# Patient Record
Sex: MALE | Race: OTHER | HISPANIC OR LATINO | Employment: STUDENT | URBAN - METROPOLITAN AREA
[De-identification: names, ages, dates, MRNs, and addresses within clinical notes are randomized per-mention and may not be internally consistent; named-entity substitution may affect disease eponyms.]

---
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
## 2022-03-22 ENCOUNTER — APPOINTMENT (OUTPATIENT)
Dept: RADIOLOGY | Facility: CLINIC | Age: 22
End: 2022-03-22
Payer: MEDICARE

## 2022-03-22 ENCOUNTER — OFFICE VISIT (OUTPATIENT)
Dept: OBGYN CLINIC | Facility: CLINIC | Age: 22
End: 2022-03-22
Payer: MEDICARE

## 2022-03-22 VITALS
HEART RATE: 79 BPM | DIASTOLIC BLOOD PRESSURE: 72 MMHG | BODY MASS INDEX: 18.87 KG/M2 | WEIGHT: 147 LBS | SYSTOLIC BLOOD PRESSURE: 114 MMHG | HEIGHT: 74 IN

## 2022-03-22 DIAGNOSIS — M23.92 INTERNAL DERANGEMENT OF LEFT KNEE: Primary | ICD-10-CM

## 2022-03-22 DIAGNOSIS — M25.562 ACUTE PAIN OF LEFT KNEE: ICD-10-CM

## 2022-03-22 PROCEDURE — 99203 OFFICE O/P NEW LOW 30 MIN: CPT | Performed by: ORTHOPAEDIC SURGERY

## 2022-03-22 PROCEDURE — 73562 X-RAY EXAM OF KNEE 3: CPT

## 2022-03-22 NOTE — PROGRESS NOTES
Assessment/Plan:   Diagnoses and all orders for this visit:    Internal derangement of left knee  -     XR knee 3 vw left non injury; Future  -     MRI knee left wo contrast; Future    Reviewed today's physical exam findings and x-ray findings with patient at time of visit  His physical exam demonstrated mild pain exacerbation with patellar grind, and he does exhibit laxity with patellar glide test   He is being provided a referral for MRI of the left knee for further examination as his x-rays did not demonstrate any acute osseous abnormality  There are no restrictions being imposed at this time  He can continue activity modification, use of a compression sleeve, ice, and NSAIDs as needed for pain and soreness  He will be seen for follow-up after his MRI is complete  Patient expresses understanding is in agreement with treatment plan  The patient has diffuse medial joint tenderness along his left knee with a positive Mylene's  Is my opinion that he may have a tear of his medial meniscus  An MRI is the gold standard to look from of this will pathology  The patient has mechanical issues  He has performed a home exercise program for the past several weeks without any success as well  Subjective:   Patient ID: Sylwia Bryanler  2000     HPI  Patient is a 22 y o  male who presents for initial evaluation of spontaneous onset left knee pain  Patient states he began experiencing pain on 3/9/2022, without any inciting injury or incident  He describes achy pain in the anterior aspect of the knee that is exacerbated with prolonged weight-bearing activity, or prolonged sitting  He is currently employed as and inventory  for [employer]  He denies any associated bruising, swelling, numbness, tingling, or feelings of instability  He is not great taking any medications for pain      The following portions of the patient's history were reviewed and updated as appropriate:  Past medical history, past surgical history, Family history, social history, current medications and allergies    Past Medical History:   Diagnosis Date    ADHD        History reviewed  No pertinent surgical history  Family History   Problem Relation Age of Onset    Anemia Mother     Hypertension Maternal Grandmother        Social History     Socioeconomic History    Marital status: Single     Spouse name: None    Number of children: None    Years of education: None    Highest education level: None   Occupational History    None   Tobacco Use    Smoking status: Never Smoker    Smokeless tobacco: Never Used   Vaping Use    Vaping Use: Some days   Substance and Sexual Activity    Alcohol use: No    Drug use: No    Sexual activity: None   Other Topics Concern    None   Social History Narrative    None     Social Determinants of Health     Financial Resource Strain: Not on file   Food Insecurity: Not on file   Transportation Needs: Not on file   Physical Activity: Not on file   Stress: Not on file   Social Connections: Not on file   Intimate Partner Violence: Not on file   Housing Stability: Not on file         Current Outpatient Medications:     amphetamine-dextroamphetamine (ADDERALL XR, 30MG,) 30 MG 24 hr capsule, every 24 hours (Patient not taking: Reported on 3/22/2022 ), Disp: , Rfl:     risperiDONE (RisperDAL) 2 mg tablet, Every 12 hours (Patient not taking: Reported on 3/22/2022 ), Disp: , Rfl:     Allergies   Allergen Reactions    Bee Venom Swelling       Review of Systems   Constitutional: Negative for chills, fever and unexpected weight change  HENT: Negative for hearing loss, nosebleeds and sore throat  Eyes: Negative for pain, redness and visual disturbance  Respiratory: Negative for cough, shortness of breath and wheezing  Cardiovascular: Negative for chest pain, palpitations and leg swelling  Gastrointestinal: Negative for abdominal pain, nausea and vomiting     Endocrine: Negative for polydipsia and polyuria  Genitourinary: Negative for dysuria and hematuria  Musculoskeletal:        As noted in HPI   Skin: Negative for rash and wound  Neurological: Negative for dizziness, numbness and headaches  Psychiatric/Behavioral: Negative for decreased concentration and suicidal ideas  The patient is not nervous/anxious  Objective:  /72   Pulse 79   Ht 6' 2" (1 88 m)   Wt 66 7 kg (147 lb)   BMI 18 87 kg/m²     Ortho Exam  Left knee -   Patient ambulates with normal gait pattern  Uses no assistive device  Patient presents with  No anatomical deformity  Skin is warm and dry to touch with no signs of erythema, ecchymosis, infection  No soft tissue swelling, no effusion noted  ROM 0 degrees-135 degrees  Mildly TTP over lateral patellar facet, nontender over medial patella facet, nontender over medial or lateral joint lines, nontender over quadriceps tendon or patellar tendon  Flexor and extensor mechanisms intact  Knee is stable to varus and valgus stress  - Lachman's  -- Anterior Drawer, - Posterior Drawer  - medial Mylene's, - lateral Mylene's  - Pivot Shift  - patellar apprehension  Mildly positive patellar grind  Patella tracks centrally without palpable crepitus  Calf compartments are soft and supple  2+ TP and DP pulses with brisk capillary refill to the toes  Sural, saphenous, tibial, superficial and deep peroneal motor and sensory distributions intact  Sensation light touch intact distally    Physical Exam  Vitals reviewed  Constitutional:       Appearance: He is well-developed  HENT:      Head: Normocephalic and atraumatic  Nose: Nose normal    Eyes:      Conjunctiva/sclera: Conjunctivae normal    Cardiovascular:      Rate and Rhythm: Normal rate  Pulmonary:      Effort: Pulmonary effort is normal    Musculoskeletal:      Cervical back: Neck supple  Skin:     General: Skin is warm and dry        Capillary Refill: Capillary refill takes less than 2 seconds  Neurological:      Mental Status: He is alert and oriented to person, place, and time  Psychiatric:         Mood and Affect: Mood normal          Behavior: Behavior normal         Diagnostic Test Review:  Attending Physician has personally reviewed pertinent imaging in PACS, impression is as follows:    Review of radiographic series taken 3/23/2022 of the left knee shows no sign of acute osseous abnormality or malalignment  No sign of lytic or blastic lesion, no sign of benign lesion as noted on x-ray report from [facility]      Procedures   No procedures performed this visit    Scribe Attestation    I,:  Vijaya Cheek am acting as a scribe while in the presence of the attending physician :       I,:  Zaki Arriola, DO personally performed the services described in this documentation    as scribed in my presence :

## 2022-03-22 NOTE — LETTER
March 22, 2022     Patient: Kaitlynn Burnette   YOB: 2000   Date of Visit: 3/22/2022       To Whom it May Concern:    Kaitlynn Burnette is under my professional care  He was seen in my office on 3/22/2022  He can return to work without limitations or restrictions beginning 3/23/2022  If you have any questions or concerns, please don't hesitate to call           Sincerely,          Julio Cesar Lee DO        CC: No Recipients

## 2022-03-24 ENCOUNTER — TELEPHONE (OUTPATIENT)
Dept: OBGYN CLINIC | Facility: CLINIC | Age: 22
End: 2022-03-24

## 2022-03-24 NOTE — TELEPHONE ENCOUNTER
Called both the pt and his mother to make them aware that his MRI will have to be scheduled somewhere else since the [facility] is not in network w his ins  I left a cb #

## 2022-03-25 NOTE — TELEPHONE ENCOUNTER
Received an approval letter from the [employer] for pt's MRI  Called pt and hollim making him aware he is good to go for his MRI on 3/30

## 2022-03-30 ENCOUNTER — HOSPITAL ENCOUNTER (OUTPATIENT)
Dept: MRI IMAGING | Facility: HOSPITAL | Age: 22
Discharge: HOME | End: 2022-03-30
Attending: ORTHOPAEDIC SURGERY
Payer: MEDICARE

## 2022-03-30 DIAGNOSIS — M23.92 INTERNAL DERANGEMENT OF LEFT KNEE: ICD-10-CM

## 2022-03-30 PROCEDURE — 73721 MRI JNT OF LWR EXTRE W/O DYE: CPT

## 2022-11-22 ENCOUNTER — OFFICE VISIT (OUTPATIENT)
Dept: URGENT CARE | Facility: CLINIC | Age: 22
End: 2022-11-22

## 2022-11-22 VITALS
RESPIRATION RATE: 18 BRPM | HEART RATE: 80 BPM | DIASTOLIC BLOOD PRESSURE: 57 MMHG | SYSTOLIC BLOOD PRESSURE: 108 MMHG | TEMPERATURE: 98.4 F | OXYGEN SATURATION: 96 % | BODY MASS INDEX: 18.87 KG/M2 | WEIGHT: 147 LBS

## 2022-11-22 DIAGNOSIS — R68.89 FLU-LIKE SYMPTOMS: Primary | ICD-10-CM

## 2022-11-22 DIAGNOSIS — R05.1 ACUTE COUGH: ICD-10-CM

## 2022-11-22 RX ORDER — BENZONATATE 100 MG/1
100 CAPSULE ORAL 3 TIMES DAILY PRN
Qty: 20 CAPSULE | Refills: 0 | Status: SHIPPED | OUTPATIENT
Start: 2022-11-22

## 2022-11-22 NOTE — PROGRESS NOTES
[facility]        NAME: Carlito Arora is a 25 y o  male  : 2000    MRN: [id number]  DATE: 2022  TIME: 2:13 PM    Assessment and Plan   Flu-like symptoms [R68 89]  1  Flu-like symptoms        2  Acute cough  benzonatate (TESSALON PERLES) 100 mg capsule        - Symptoms most likely due to viral infection  - Patient is non-toxic with stable vital signs and no signs of respiratory distress   - Covid/Flu PCR sent  Results back in 24-48 hours  I will call with positive results or patient may call office to request results   - Supportive care with rest, fluids, and OTC decongestants, nasal sprays, cough suppressants, Tylenol/Ibuprofen PRN   - Educated on return precautions to ED for any new or worsening symptoms including difficulty breathing, chest pain, and high fever       Patient Instructions       Follow up with PCP in 3-5 days  Proceed to  ER if symptoms worsen  Chief Complaint     Chief Complaint   Patient presents with   • Nausea   • Cough   • Fatigue     Started Thursday thinks he has flu         History of Present Illness       Patient is a 26 yo male who presents for evaluation of cough, congestion, body aches, nausea, fatigue x 6 days  Denies fevers, trouble breathing, weakness, inability to tolerate PO intake  No abdominal pain, vomiting, or diarrhea  Review of Systems   Review of Systems   Constitutional: Positive for fatigue  Negative for chills and fever  HENT: Positive for congestion  Negative for ear discharge, ear pain, postnasal drip, rhinorrhea, sinus pressure, sinus pain and sore throat  Eyes: Negative for visual disturbance  Respiratory: Positive for cough  Negative for chest tightness, shortness of breath and wheezing  Cardiovascular: Negative for chest pain  Gastrointestinal: Positive for nausea  Negative for abdominal pain, diarrhea and vomiting  Musculoskeletal: Positive for arthralgias and myalgias     Neurological: Negative for dizziness, weakness, numbness and headaches  Psychiatric/Behavioral: Negative for behavioral problems and confusion  Current Medications       Current Outpatient Medications:   •  benzonatate (TESSALON PERLES) 100 mg capsule, Take 1 capsule (100 mg total) by mouth 3 (three) times a day as needed for cough, Disp: 20 capsule, Rfl: 0  •  amphetamine-dextroamphetamine (ADDERALL XR, 30MG,) 30 MG 24 hr capsule, every 24 hours (Patient not taking: Reported on 3/22/2022 ), Disp: , Rfl:   •  risperiDONE (RisperDAL) 2 mg tablet, Every 12 hours (Patient not taking: Reported on 3/22/2022 ), Disp: , Rfl:     Current Allergies     Allergies as of 11/22/2022 - Reviewed 11/22/2022   Allergen Reaction Noted   • Bee venom Swelling 09/10/2018            The following portions of the patient's history were reviewed and updated as appropriate: allergies, current medications, past family history, past medical history, past social history, past surgical history and problem list      Past Medical History:   Diagnosis Date   • ADHD        History reviewed  No pertinent surgical history  Family History   Problem Relation Age of Onset   • Anemia Mother    • Hypertension Maternal Grandmother          Medications have been verified  Objective   /57   Pulse 80   Temp 98 4 °F (36 9 °C)   Resp 18   Wt 66 7 kg (147 lb)   SpO2 96%   BMI 18 87 kg/m²        Physical Exam     Physical Exam  Constitutional:       General: He is not in acute distress  Appearance: Normal appearance  He is not ill-appearing or diaphoretic  HENT:      Right Ear: Tympanic membrane, ear canal and external ear normal       Left Ear: Tympanic membrane, ear canal and external ear normal       Nose: Nose normal       Mouth/Throat:      Mouth: Mucous membranes are moist       Pharynx: Oropharynx is clear        Comments: Mild posterior OP erythema   Eyes:      Conjunctiva/sclera: Conjunctivae normal    Cardiovascular:      Rate and Rhythm: Normal rate and regular rhythm  Heart sounds: Normal heart sounds  Pulmonary:      Effort: Pulmonary effort is normal       Breath sounds: Normal breath sounds  Skin:     General: Skin is warm and dry  Neurological:      Mental Status: He is alert     Psychiatric:         Mood and Affect: Mood normal          Behavior: Behavior normal

## 2022-11-22 NOTE — LETTER
November 22, 2022     Patient: Hellen Cleveland   YOB: 2000   Date of Visit: 11/22/2022       To Whom it May Concern:    Hellen Cleveland was seen in my clinic on 11/22/2022  He is excused from work 11/22/2022    If you have any questions or concerns, please don't hesitate to call           Sincerely,          Veronika Khalil PA-C        CC: No Recipients

## 2022-11-23 LAB
FLUAV RNA RESP QL NAA+PROBE: POSITIVE
FLUBV RNA RESP QL NAA+PROBE: NEGATIVE
SARS-COV-2 RNA RESP QL NAA+PROBE: NEGATIVE

## 2025-07-10 ENCOUNTER — HOSPITAL ENCOUNTER (EMERGENCY)
Facility: HOSPITAL | Age: 25
Discharge: HOME | End: 2025-07-10
Attending: EMERGENCY MEDICINE
Payer: MEDICARE

## 2025-07-10 VITALS
TEMPERATURE: 98.9 F | BODY MASS INDEX: 20.92 KG/M2 | DIASTOLIC BLOOD PRESSURE: 64 MMHG | SYSTOLIC BLOOD PRESSURE: 102 MMHG | OXYGEN SATURATION: 100 % | HEART RATE: 78 BPM | WEIGHT: 157.85 LBS | HEIGHT: 73 IN | RESPIRATION RATE: 18 BRPM

## 2025-07-10 DIAGNOSIS — Q18.1 CONGENITAL PIT, PREAURICULAR: ICD-10-CM

## 2025-07-10 DIAGNOSIS — L02.91 ABSCESS: Primary | ICD-10-CM

## 2025-07-10 DIAGNOSIS — L72.9 SUBCUTANEOUS CYST: ICD-10-CM

## 2025-07-10 LAB
ALBUMIN SERPL BCG-MCNC: 4.5 G/DL (ref 3.5–5)
ALP SERPL-CCNC: 57 U/L (ref 34–104)
ALT SERPL W P-5'-P-CCNC: 9 U/L (ref 7–52)
ANION GAP SERPL CALCULATED.3IONS-SCNC: 4 MMOL/L (ref 4–13)
AST SERPL W P-5'-P-CCNC: 11 U/L (ref 13–39)
BASOPHILS # BLD AUTO: 0.02 THOUSANDS/ÂΜL (ref 0–0.1)
BASOPHILS NFR BLD AUTO: 0 % (ref 0–1)
BILIRUB SERPL-MCNC: 0.53 MG/DL (ref 0.2–1)
BUN SERPL-MCNC: 6 MG/DL (ref 5–25)
CALCIUM SERPL-MCNC: 8.9 MG/DL (ref 8.4–10.2)
CHLORIDE SERPL-SCNC: 103 MMOL/L (ref 96–108)
CO2 SERPL-SCNC: 30 MMOL/L (ref 21–32)
CREAT SERPL-MCNC: 1.02 MG/DL (ref 0.6–1.3)
EOSINOPHIL # BLD AUTO: 0.04 THOUSAND/ÂΜL (ref 0–0.61)
EOSINOPHIL NFR BLD AUTO: 1 % (ref 0–6)
ERYTHROCYTE [DISTWIDTH] IN BLOOD BY AUTOMATED COUNT: 12.4 % (ref 11.6–15.1)
GFR SERPL CREATININE-BSD FRML MDRD: 102 ML/MIN/1.73SQ M
GLUCOSE SERPL-MCNC: 115 MG/DL (ref 65–140)
HCT VFR BLD AUTO: 40.2 % (ref 36.5–49.3)
HGB BLD-MCNC: 13.1 G/DL (ref 12–17)
IMM GRANULOCYTES # BLD AUTO: 0.02 THOUSAND/UL (ref 0–0.2)
IMM GRANULOCYTES NFR BLD AUTO: 0 % (ref 0–2)
LYMPHOCYTES # BLD AUTO: 1.8 THOUSANDS/ÂΜL (ref 0.6–4.47)
LYMPHOCYTES NFR BLD AUTO: 21 % (ref 14–44)
MCH RBC QN AUTO: 28.7 PG (ref 26.8–34.3)
MCHC RBC AUTO-ENTMCNC: 32.6 G/DL (ref 31.4–37.4)
MCV RBC AUTO: 88 FL (ref 82–98)
MONOCYTES # BLD AUTO: 0.46 THOUSAND/ÂΜL (ref 0.17–1.22)
MONOCYTES NFR BLD AUTO: 6 % (ref 4–12)
NEUTROPHILS # BLD AUTO: 6.1 THOUSANDS/ÂΜL (ref 1.85–7.62)
NEUTS SEG NFR BLD AUTO: 72 % (ref 43–75)
NRBC BLD AUTO-RTO: 0 /100 WBCS
PLATELET # BLD AUTO: 169 THOUSANDS/UL (ref 149–390)
PMV BLD AUTO: 11.9 FL (ref 8.9–12.7)
POTASSIUM SERPL-SCNC: 3.7 MMOL/L (ref 3.5–5.3)
PROT SERPL-MCNC: 6.6 G/DL (ref 6.4–8.4)
RBC # BLD AUTO: 4.57 MILLION/UL (ref 3.88–5.62)
SODIUM SERPL-SCNC: 137 MMOL/L (ref 135–147)
WBC # BLD AUTO: 8.44 THOUSAND/UL (ref 4.31–10.16)

## 2025-07-10 PROCEDURE — 85025 COMPLETE CBC W/AUTO DIFF WBC: CPT

## 2025-07-10 PROCEDURE — 36415 COLL VENOUS BLD VENIPUNCTURE: CPT

## 2025-07-10 PROCEDURE — 80053 COMPREHEN METABOLIC PANEL: CPT

## 2025-07-10 PROCEDURE — 99284 EMERGENCY DEPT VISIT MOD MDM: CPT | Performed by: EMERGENCY MEDICINE

## 2025-07-10 PROCEDURE — 99283 EMERGENCY DEPT VISIT LOW MDM: CPT

## 2025-07-10 RX ORDER — SULFAMETHOXAZOLE AND TRIMETHOPRIM 800; 160 MG/1; MG/1
1 TABLET ORAL ONCE
Status: COMPLETED | OUTPATIENT
Start: 2025-07-10 | End: 2025-07-10

## 2025-07-10 RX ORDER — SULFAMETHOXAZOLE AND TRIMETHOPRIM 800; 160 MG/1; MG/1
1 TABLET ORAL 2 TIMES DAILY
Qty: 14 TABLET | Refills: 0 | Status: SHIPPED | OUTPATIENT
Start: 2025-07-10 | End: 2025-07-17

## 2025-07-10 RX ADMIN — SULFAMETHOXAZOLE AND TRIMETHOPRIM 1 TABLET: 800; 160 TABLET ORAL at 19:39

## 2025-07-10 NOTE — ED ATTENDING ATTESTATION
7/10/2025  I, Walter Rico DO, saw and evaluated the patient. I have discussed the patient with the resident/non-physician practitioner and agree with the resident's/non-physician practitioner's findings, Plan of Care, and MDM as documented in the resident's/non-physician practitioner's note, except where noted. All available labs and Radiology studies were reviewed.  I was present for key portions of any procedure(s) performed by the resident/non-physician practitioner and I was immediately available to provide assistance.       At this point I agree with the current assessment done in the Emergency Department.  I have conducted an independent evaluation of this patient a history and physical is as follows:      26 yo man presents for evaluation of preauriclar pit draining pus. No fever. Area is red and hot now after pt squeezed it for 30 mins earlier, expressing large amount of pus.    Imp: infected pre-auricular pit plan: TMP/SMX, f/u PMD        ED Course         Critical Care Time  Procedures

## 2025-07-10 NOTE — ED PROVIDER NOTES
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, preauricular     7/10/2025  7:38 PM Merkert, Samuel Add [L02.91] Abscess     7/10/2025  7:38 PM Merkert, Samuel Remove [L02.91] Abscess     7/10/2025  7:39 PM Merkert, Samuel Add [L02.91] Abscess     7/10/2025  8:08 PM Merkert, Samuel Add [L72.9] Subcutaneous cyst     7/10/2025  8:08 PM Merkert, Samuel Modify [L72.9] Subcutaneous cyst preauricular    7/10/2025  8:08 PM Merkert, Samuel 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                   Assessment & Plan       Medical Decision Making  Patient is a 25-year-old male presenting for facial pain and swelling.    Differential includes but not limited to abscess, cellulitis, sebaceous cyst, doubt malignant ostitis externa.  History concerning for sebaceous cyst that became infected.  Patient treated with antibiotics here and with prescription.    Patient visit for discharge with PCP follow-up, general surgery follow-up, and return precautions.    Risk  Prescription drug management.             Medications   sulfamethoxazole-trimethoprim (BACTRIM DS) 800-160 mg per tablet 1 tablet (1 tablet Oral Given 7/10/25 1939)       ED Risk Strat Scores                    No data recorded        SBIRT 22yo+      Flowsheet Row Most Recent Value   Initial Alcohol Screen: US AUDIT-C     1. How often do you have a drink containing alcohol? 0 Filed at: 07/10/2025 1815   2. How many drinks containing alcohol do you have on a typical day you are drinking?  0 Filed at: 07/10/2025 1815   3a. Male UNDER 65: How often do you  "have five or more drinks on one occasion? 0 Filed at: 07/10/2025 1815   3b. FEMALE Any Age, or MALE 65+: How often do you have 4 or more drinks on one occassion? 0 Filed at: 07/10/2025 1815   Audit-C Score 0 Filed at: 07/10/2025 1815   EAN: How many times in the past year have you...    Used an illegal drug or used a prescription medication for non-medical reasons? Never Filed at: 07/10/2025 1815                            History of Present Illness       Chief Complaint   Patient presents with    Ear Problem     R sided ear pain, noticed a \"hole\" poked it and pt reports \"never ending pus.\" External area now red, swollen, hot and painful. Pain is now radiating down jaw line.       Past Medical History[1]   Past Surgical History[2]   Family History[3]   Social History[4]   E-Cigarette/Vaping    E-Cigarette Use Current Some Day User       E-Cigarette/Vaping Substances    Nicotine Yes     THC Yes       I have reviewed and agree with the history as documented.     Patient is a 25-year-old male without significant past medical history presenting for facial pain and swelling.  Patient states that he had significant amount of right-sided facial pain and swelling that progressed over the last 2 days.  Yesterday, patient squeezed the area and a very large amount of pus came out.  Patient states that it came out of a hole that he has had in front of his ear for his whole life.  Patient states that after he drained the pus, the redness and swelling have significantly improved.  He now has complaint of pain and swelling just in front of the ear instead of the whole side of his face which he states was there yesterday.  He denies fever, chills, diaphoresis, difficulty hearing, trismus, difficulty swallowing, difficulty breathing, or any other symptoms.        Review of Systems        Objective       ED Triage Vitals   Temperature Pulse Blood Pressure Respirations SpO2 Patient Position - Orthostatic VS   07/10/25 1812 07/10/25 " 1815 07/10/25 1815 07/10/25 1815 07/10/25 1815 --   98.9 °F (37.2 °C) 78 102/64 18 100 %       Temp Source Heart Rate Source BP Location FiO2 (%) Pain Score    07/10/25 1812 -- -- -- 07/10/25 1815    Temporal    3      Vitals      Date and Time Temp Pulse SpO2 Resp BP Pain Score FACES Pain Rating User   07/10/25 1815 -- 78 100 % 18 102/64 3 -- VB   07/10/25 1812 98.9 °F (37.2 °C) -- -- -- -- -- -- VB            Physical Exam  Vitals and nursing note reviewed.   Constitutional:       General: He is not in acute distress.     Appearance: He is not ill-appearing, toxic-appearing or diaphoretic.   HENT:      Head:      Comments: Right-sided erythema/induration in front of the ear.  Preauricular pit present.  No fluctuance appreciated.     Right Ear: Tympanic membrane, ear canal and external ear normal.      Left Ear: Tympanic membrane, ear canal and external ear normal.      Mouth/Throat:      Mouth: Mucous membranes are moist.      Pharynx: Oropharynx is clear.     Eyes:      Extraocular Movements: Extraocular movements intact.      Pupils: Pupils are equal, round, and reactive to light.       Cardiovascular:      Rate and Rhythm: Normal rate.   Pulmonary:      Effort: Pulmonary effort is normal.     Musculoskeletal:         General: Normal range of motion.      Cervical back: Normal range of motion and neck supple. No rigidity or tenderness.     Skin:     General: Skin is warm and dry.     Neurological:      General: No focal deficit present.      Mental Status: He is oriented to person, place, and time.      Cranial Nerves: No cranial nerve deficit.         Results Reviewed       Procedure Component Value Units Date/Time    Comprehensive metabolic panel [[id number]]  (Abnormal) Collected: 07/10/25 1817    Lab Status: Final result Specimen: Blood from Arm, Left Updated: 07/10/25 1851     Sodium 137 mmol/L      Potassium 3.7 mmol/L      Chloride 103 mmol/L      CO2 30 mmol/L      ANION GAP 4 mmol/L      BUN 6 mg/dL       Creatinine 1.02 mg/dL      Glucose 115 mg/dL      Calcium 8.9 mg/dL      AST 11 U/L      ALT 9 U/L      Alkaline Phosphatase 57 U/L      Total Protein 6.6 g/dL      Albumin 4.5 g/dL      Total Bilirubin 0.53 mg/dL      eGFR 102 ml/min/1.73sq m     Narrative:      [employer] Kid[employer] guidelines for Chronic Kidney Disease (CKD):     Stage 1 with normal or high GFR (GFR > 90 mL/min/1.73 square meters)    Stage 2 Mild CKD (GFR = 60-89 mL/min/1.73 square meters)    Stage 3A Moderate CKD (GFR = 45-59 mL/min/1.73 square meters)    Stage 3B Moderate CKD (GFR = 30-44 mL/min/1.73 square meters)    Stage 4 Severe CKD (GFR = 15-29 mL/min/1.73 square meters)    Stage 5 End Stage CKD (GFR <15 mL/min/1.73 square meters)  Note: GFR calculation is accurate only with a steady state creatinine    CBC and differential [[id number]] Collected: 07/10/25 1817    Lab Status: Final result Specimen: Blood from Arm, Left Updated: 07/10/25 1837     WBC 8.44 Thousand/uL      RBC 4.57 Million/uL      Hemoglobin 13.1 g/dL      Hematocrit 40.2 %      MCV 88 fL      MCH 28.7 pg      MCHC 32.6 g/dL      RDW 12.4 %      MPV 11.9 fL      Platelets 169 Thousands/uL      nRBC 0 /100 WBCs      Segmented % 72 %      Immature Grans % 0 %      Lymphocytes % 21 %      Monocytes % 6 %      Eosinophils Relative 1 %      Basophils Relative 0 %      Absolute Neutrophils 6.10 Thousands/µL      Absolute Immature Grans 0.02 Thousand/uL      Absolute Lymphocytes 1.80 Thousands/µL      Absolute Monocytes 0.46 Thousand/µL      Eosinophils Absolute 0.04 Thousand/µL      Basophils Absolute 0.02 Thousands/µL             No orders to display       Procedures    ED Medication and Procedure Management   Prior to Admission Medications   Prescriptions Last Dose Informant Patient Reported? Taking?   amphetamine-dextroamphetamine (ADDERALL XR, 30MG,) 30 MG 24 hr capsule Not Taking  Yes No   Sig: every 24 hours   Patient not taking: Reported on 3/22/2022    benzonatate (TESSALON PERLES) 100 mg capsule Not Taking  No No   Sig: Take 1 capsule (100 mg total) by mouth 3 (three) times a day as needed for cough   Patient not taking: Reported on 7/10/2025   risperiDONE (RisperDAL) 2 mg tablet Not Taking  Yes No   Sig: Every 12 hours   Patient not taking: Reported on 3/22/2022      Facility-Administered Medications: None     Discharge Medication List as of 7/10/2025  8:11 PM        START taking these medications    Details   sulfamethoxazole-trimethoprim (BACTRIM DS) 800-160 mg per tablet Take 1 tablet by mouth 2 (two) times a day for 7 days smx-tmp DS (BACTRIM) 800-160 mg tabs (1tab q12 D10), Starting Thu 7/10/2025, Until Thu 7/17/2025, Normal           CONTINUE these medications which have NOT CHANGED    Details   amphetamine-dextroamphetamine (ADDERALL XR, 30MG,) 30 MG 24 hr capsule every 24 hours, Historical Med      benzonatate (TESSALON PERLES) 100 mg capsule Take 1 capsule (100 mg total) by mouth 3 (three) times a day as needed for cough, Starting Tue 11/22/2022, Normal      risperiDONE (RisperDAL) 2 mg tablet Every 12 hours, Historical Med             ED SEPSIS DOCUMENTATION   Time reflects when diagnosis was documented in both MDM as applicable and the Disposition within this note       Time User Action Codes Description Comment    7/10/2025  7:38 PM Samuel Milan Add [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  [3]   Family History  Problem Relation Name Age of Onset    Anemia Mother      Hypertension Maternal Grandmother     [4]   Social History  Tobacco Use    Smoking status: Never    Smokeless tobacco: Never   Vaping Use    Vaping status: Some Days    Substances: Nicotine, THC   Substance Use Topics    Alcohol use: No    Drug use: Yes     Types: Marijuana        Samuel Milan MD  07/13/25 [id number]

## 2025-07-11 NOTE — DISCHARGE INSTRUCTIONS
Please follow-up with primary care provider and general surgery-a referral was made for you.  Please return to the ED with new or worsening symptoms-see attached.  Please take antibiotic as prescribed.